# Patient Record
Sex: MALE | HISPANIC OR LATINO
[De-identification: names, ages, dates, MRNs, and addresses within clinical notes are randomized per-mention and may not be internally consistent; named-entity substitution may affect disease eponyms.]

---

## 2018-05-12 ENCOUNTER — TRANSCRIPTION ENCOUNTER (OUTPATIENT)
Age: 43
End: 2018-05-12

## 2019-05-20 PROBLEM — Z00.00 ENCOUNTER FOR PREVENTIVE HEALTH EXAMINATION: Status: ACTIVE | Noted: 2019-05-20

## 2019-05-21 ENCOUNTER — APPOINTMENT (OUTPATIENT)
Dept: ORTHOPEDIC SURGERY | Facility: CLINIC | Age: 44
End: 2019-05-21
Payer: COMMERCIAL

## 2019-05-21 DIAGNOSIS — M17.10 UNILATERAL PRIMARY OSTEOARTHRITIS, UNSPECIFIED KNEE: ICD-10-CM

## 2019-05-21 DIAGNOSIS — M94.269 CHONDROMALACIA, UNSPECIFIED KNEE: ICD-10-CM

## 2019-05-21 PROCEDURE — 99203 OFFICE O/P NEW LOW 30 MIN: CPT

## 2019-05-21 PROCEDURE — 73562 X-RAY EXAM OF KNEE 3: CPT | Mod: LT

## 2019-05-21 NOTE — ASSESSMENT
[FreeTextEntry1] : Patient offered physical therapy but elects home exercises only at this time. Discussed at length the mild lateral tilt and exam consistent with patellofemoral issues. Patient like some exercises and is to follow up in 6 weeks of persistent discomfort.\par \par \par

## 2019-05-21 NOTE — PHYSICAL EXAM
[de-identified] : Left knee\par \par Constitutional: \par The patient is healthy-appearing and in no apparent distress. \par \par Gait:\par The patient ambulates with a normal gait and no limp.\par \par Cardiovascular System: \par There is capillary refill less than 2 seconds. \par \par Skin: \par There is no skin abnormalities.\par \par Left Knee: \par Bony Palpation: \par There is no tenderness of the medial or lateral joint line, the medial of lateral femoral chondyle, tibial tubercle, superior or inferior patella.\par There is tenderness to the medial and lateral patellar facets.\par \par Soft Tissue Palpation: \par There is no tenderness of the medial and lateral retinaculum, quadriceps tendon, patella tendon, ITB or pes anserine.\par \par Active Range of Motion: \par There is full range of motion at the knee actively and passively. \par There is lateral patellar retinaculum tightness/hypomobility.\par \par Special Tests: \par There is a negative Apley and Steinmanns.  There is a negative Lachman, Anterior Drawer, and Posterior Drawer.  There is no varus or valgus laxity.\par \par Strength: \par There is 5/5 hip flexion and 5/5 knee flexion and extension.  \par \par Psychiatric: \par The patient demonstrates a normal mood and affect and is active and alert\par  [de-identified] : X-ray left knee. There is mild lateral patellar tilt as well as mild superior patellar arthritis

## 2019-05-31 ENCOUNTER — OTHER (OUTPATIENT)
Age: 44
End: 2019-05-31

## 2019-05-31 RX ORDER — NABUMETONE 500 MG/1
500 TABLET, FILM COATED ORAL
Qty: 30 | Refills: 2 | Status: ACTIVE | COMMUNITY
Start: 2019-05-31 | End: 1900-01-01

## 2022-08-11 ENCOUNTER — APPOINTMENT (OUTPATIENT)
Dept: ORTHOPEDIC SURGERY | Facility: CLINIC | Age: 47
End: 2022-08-11

## 2022-08-11 DIAGNOSIS — S83.232A COMPLEX TEAR OF MEDIAL MENISCUS, CURRENT INJURY, LEFT KNEE, INITIAL ENCOUNTER: ICD-10-CM

## 2022-08-11 DIAGNOSIS — M17.12 UNILATERAL PRIMARY OSTEOARTHRITIS, LEFT KNEE: ICD-10-CM

## 2022-08-11 PROCEDURE — 99214 OFFICE O/P EST MOD 30 MIN: CPT

## 2022-08-11 NOTE — ASSESSMENT
[FreeTextEntry1] : Discussed at length with patient regarding symptoms and diagnosis.  Treatment options discussed and patient's questions answered and patient expresses understanding.  Reivewed MRI and minimal symptoms currently with symptoms most c/w with PF arthritis.  Nonoperative and operative treatments reviewed and risks for increased meniscal size with nonperative.  Patient aware that given underlying PF arthritis, he may not fully feel better with any arthroscopy and may ultimately require arthroplasty.  Patient offered physical therapy but declined and elects home exercises/observation only.  Patient to follow-up in office if persistent or recurrent symptoms.  Patient instructed to call if any questions or concerns.\par

## 2022-08-11 NOTE — PHYSICAL EXAM
[de-identified] : Left knee\par \par Constitutional: \par The patient is healthy-appearing and in no apparent distress. \par \par Gait:\par The patient ambulates with a normal gait and no limp.\par \par Cardiovascular System: \par The capillary refill is less than 2 seconds. \par \par Skin: \par There are no skin abnormalities.\par \par Left Knee: \par \par Bony Palpation: \par There is no tenderness of the medial joint line. \par There is no tenderness of the lateral joint line.\par There is no tenderness of the medial femoral chondyle.\par There is no tenderness of the lateral femoral chondyle.\par There is no tenderness of the tibial tubercle.\par There is no tenderness of the superior patella.\par There is no tenderness of the inferior patella.\par There is tenderness of the medial patellar facet.\par There is tenderness of the lateral patellar facet.\par \par Soft Tissue Palpation: \par There is no tenderness of the medial retinaculum.\par There is no tenderness of the lateral retinaculum.\par There is no tenderness of the quadriceps tendon.\par There is no tenderness of the patella tendon.\par There is no tenderness of the ITB.\par There is no tenderness of the pes anserine.\par \par Active Range of Motion: \par The range of motion at the knee actively and passively is full. \par \par Special Tests: \par There is a negative Apley.\par There is a negative Steinmanns. \par There is a negative Lachman and Anterior Drawer.\par There is a negative Posterior Drawer.  \par There is no varus or valgus laxity.\par \par Strength: \par There is 5/5 hip flexion and 5/5 knee flexion and extension.  \par \par Psychiatric: \par The patient demonstrates a normal mood and affect and is active and alert.\par  [de-identified] : MRI LEFT knee ( [facility] ):  There is a posterior medial complex meniscus tear.  There is high-grade proximal patellar arthritis.

## 2022-08-11 NOTE — HISTORY OF PRESENT ILLNESS
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  States he had been doing well but over the past month he has had increased pain deep in the knee and medially and posterior.  He states seen by another MD and MRI showed medial meniscal tear as well as some arthritis.  Patient states he currently has no pain except on extreme knee flexion.